# Patient Record
Sex: FEMALE | Race: WHITE | NOT HISPANIC OR LATINO
[De-identification: names, ages, dates, MRNs, and addresses within clinical notes are randomized per-mention and may not be internally consistent; named-entity substitution may affect disease eponyms.]

---

## 2019-08-01 ENCOUNTER — APPOINTMENT (OUTPATIENT)
Dept: ORTHOPEDIC SURGERY | Facility: CLINIC | Age: 79
End: 2019-08-01
Payer: MEDICARE

## 2019-08-01 VITALS
HEIGHT: 64 IN | SYSTOLIC BLOOD PRESSURE: 150 MMHG | HEART RATE: 79 BPM | BODY MASS INDEX: 29.02 KG/M2 | WEIGHT: 170 LBS | DIASTOLIC BLOOD PRESSURE: 79 MMHG

## 2019-08-01 DIAGNOSIS — Z87.39 PERSONAL HISTORY OF OTHER DISEASES OF THE MUSCULOSKELETAL SYSTEM AND CONNECTIVE TISSUE: ICD-10-CM

## 2019-08-01 DIAGNOSIS — Z98.1 ARTHRODESIS STATUS: ICD-10-CM

## 2019-08-01 DIAGNOSIS — M60.9 MYOSITIS, UNSPECIFIED: ICD-10-CM

## 2019-08-01 PROCEDURE — 99204 OFFICE O/P NEW MOD 45 MIN: CPT | Mod: 25

## 2019-08-01 PROCEDURE — 20552 NJX 1/MLT TRIGGER POINT 1/2: CPT

## 2019-08-01 NOTE — PHYSICAL EXAM
[de-identified] : Cervical exam:\par CONSTITUTIONAL: Patient is a very pleasant individual who is well-nourished and appears stated age. \par PSYCHIATRIC: Alert and oriented times three and in no apparent distress, and participates with orthopedic evaluation well.\par HEAD: Atraumatic and nonsyndromic in appearance.\par EENT: No thyromegaly, EOMI.\par RESPIRATORY: Respiratory rate is regular, not dyspneic on examination.\par LYMPHATICS: There is no cervical or axillary lymphadenopathy.\par INTEGUMENTARY: Skin is clean, dry, and intact about the bilateral upper extremities and cervical spine. \par VASCULAR: There is brisk capillary refill about the bilateral upper extremities and radial pulses are 2/4. \par NEUROLOGIC: Negative L'hirmitte, negative Spurling’s sign. There are no pathologic reflexes. There is no decrease in sensation of the bilateral upper extremities on Wartenberg pinwheel examination. Deep tendon reflexes are well-maintained at +2/4 of the bilateral upper extremities and are symmetric.\par MUSCULOSKELETAL: There is no visible muscular atrophy. Manual motor strength is well maintained in the bilateral upper extremities. Cervical range of motion is well maintained. The patient ambulates in a non-myelopathic manner. Normal secondary orthopaedic exam of bilateral shoulders, elbows and hands. Elbow flexion and extension, wrist extension, finger flexion and abduction are well maintained. Consistent with bilateral cervical, paraspinal, and trapezial myositis.

## 2019-08-01 NOTE — ADDENDUM
[FreeTextEntry1] : Documented by Eben Walker acting as a scribe for Dr. Omar Odell on 08/01/2019.\par \par All medical record entries made by the Scribe were at my, Dr. Omar Odell, direction and personally dictated by me on 08/01/2019. I have reviewed the chart and agree that the record accurately reflects my personal performance of the history, physical exam, assessment and plan. I have also personally directed, reviewed, and agreed with the chart.

## 2019-08-01 NOTE — HISTORY OF PRESENT ILLNESS
[de-identified] : 79 year old female presents with chronic cervical upper thoracic pain. She states the same location, quality, intensity has been occurring x 10 years. She had a recent lumbar fusion in [place] in Feb 2019, and is currently in [place] for the summer. VIGNESH questionnaire is negative. She states she feels better with activity.  [Ataxia] : no ataxia [Incontinence] : no incontinence [Loss of Dexterity] : good dexterity [Urinary Ret.] : no urinary retention

## 2019-08-01 NOTE — PROCEDURE
[de-identified] : Verbal consent was obtained and all questions, comments and concerns were addressed. After Bilateral trapezial trigger points were cleansed with alcohol prep X 3, ethyl chloride was used as local anesthetic. Under sterile precautions 1cc of 1% lidocaine without epinephrine, plus 10 mg depomedrol, were instilled into the affected trigger points. Patient tolerated procedure well. Dry sterile dressing was placed and patient described relief of pain 5 minutes after procedure was performed.\par

## 2019-08-01 NOTE — DISCUSSION/SUMMARY
[de-identified] : I do not believe she has any acute operative indications at this time. I have recommended that the pt continue with a conservative treatment plan. Pt has been referred to physical therapy for decreased pain modalities, core strengthening modalities and physical modalities. Pt will be seen 1 month prior to her leaving [facility].  At next visit I will obtain thoracic and Lumbar Xrays.

## 2019-08-27 ENCOUNTER — APPOINTMENT (OUTPATIENT)
Dept: ORTHOPEDIC SURGERY | Facility: CLINIC | Age: 79
End: 2019-08-27

## 2020-01-23 ENCOUNTER — APPOINTMENT (RX ONLY)
Dept: URBAN - METROPOLITAN AREA CLINIC 102 | Facility: CLINIC | Age: 80
Setting detail: DERMATOLOGY
End: 2020-01-23

## 2020-01-23 DIAGNOSIS — L57.0 ACTINIC KERATOSIS: ICD-10-CM

## 2020-01-23 PROCEDURE — ? PDT: BLUE

## 2020-01-23 PROCEDURE — 96567 PDT DSTR PRMLG LES SKN: CPT

## 2020-01-23 ASSESSMENT — LOCATION DETAILED DESCRIPTION DERM: LOCATION DETAILED: LEFT INFERIOR MEDIAL MALAR CHEEK

## 2020-01-23 ASSESSMENT — LOCATION ZONE DERM: LOCATION ZONE: FACE

## 2020-01-23 ASSESSMENT — LOCATION SIMPLE DESCRIPTION DERM: LOCATION SIMPLE: LEFT CHEEK

## 2022-08-31 DIAGNOSIS — M25.562 PAIN IN LEFT KNEE: ICD-10-CM

## 2022-09-01 ENCOUNTER — APPOINTMENT (OUTPATIENT)
Dept: ORTHOPEDIC SURGERY | Facility: CLINIC | Age: 82
End: 2022-09-01

## 2022-09-01 VITALS
BODY MASS INDEX: 28.17 KG/M2 | WEIGHT: 165 LBS | HEART RATE: 77 BPM | DIASTOLIC BLOOD PRESSURE: 86 MMHG | SYSTOLIC BLOOD PRESSURE: 131 MMHG | HEIGHT: 64 IN

## 2022-09-01 DIAGNOSIS — M17.12 UNILATERAL PRIMARY OSTEOARTHRITIS, LEFT KNEE: ICD-10-CM

## 2022-09-01 PROCEDURE — 99204 OFFICE O/P NEW MOD 45 MIN: CPT | Mod: 25

## 2022-09-01 PROCEDURE — 73564 X-RAY EXAM KNEE 4 OR MORE: CPT | Mod: LT

## 2022-09-01 PROCEDURE — 20610 DRAIN/INJ JOINT/BURSA W/O US: CPT | Mod: LT

## 2022-09-01 NOTE — HISTORY OF PRESENT ILLNESS
[de-identified] : Patient is an 82-year-old female here today for evaluation of left knee pain has been going on for the past few months.  Patient states she has pain mainly over the lateral and posterior aspect of the knee.  Hurts with navigating stairs and rising reseated position.  Is unable to take NSAIDs but use Voltaren gel and Tylenol with some relief.  Wears a brace on the knee that helps.  Denies any locking popping or buckling.  Denies any neurovascular compromise.  Does complain of swelling in the knee.

## 2022-09-01 NOTE — PROCEDURE
[de-identified] : Gel One # 1 Left knee\par Discussed at length with the patient the planned Gel One injection. The risks, benefits, convalescence and alternatives were reviewed. The possible side effects discussed included but were not limited to: pain, swelling, heat and redness. There symptoms are generally mild but if they are extensive then contact the office. Giving pain relievers by mouth such as NSAID´s or Tylenol can generally treat the reactions to Gel ONe. Rare cases of infection have been noted. Rash, hives and itching may occur post injection. If you have muscle pain or cramps, flushing and or swelling of the face, rapid heart beat, nausea, dizziness, fever, chills, headache, difficulty breathing, swelling in the arms or legs, or have a prickly feeling of your skin, contact a health care provider immediately.\par \par Following this discussion, the knee was prepped with betadine and under sterile condition the Gel One injection was performed with a 22 gauge needle. The needle was introduced into the joint, aspiration was performed to ensure intra-articular placement and the medication was injected. Upon withdrawal of the needle the site was cleaned with alcohol and a bandaid applied. The patient tolerated the injection well and there were no adverse effects. Post injection instructions included no strenuous activity for 24 hours, cryotherapy and if there are any adverse effects to contact the office.

## 2022-09-01 NOTE — PHYSICAL EXAM
[de-identified] : GENERAL APPEARANCE: Well nourished and hydrated, pleasant, alert, and oriented x 3. Appears their stated age. \par HEENT: Normocephalic, extraocular eye motion intact. Nasal septum midline. Oral cavity clear. External auditory canal clear. \par RESPIRATORY: Breath sounds clear and audible in all lobes. No wheezing, No accessory muscle use.\par CARDIOVASCULAR: No apparent abnormalities. No lower leg edema. No varicosities. Pedal pulses are palpable.\par NEUROLOGIC: Sensation is normal, no muscle weakness in the upper or lower extremities.\par DERMATOLOGIC: No apparent skin lesions, moist, warm, no rash.\par SPINE: Cervical spine appears normal and moves freely; thoracic spine appears normal and moves freely; lumbosacral spine appears normal and moves freely, normal, nontender.\par MUSCULOSKELETAL: Hands, wrists, and elbows are normal and move freely, shoulders are normal and move freely. \par Psychiatric: Oriented to person, place, and time, insight and judgement were intact and the affect was normal. \par Musculoskeletal:. Left knee exam shows moderate effusion, ROM is 5-1 20 degrees, no instability, no pain with Alec, lateral joint line tenderness. \par 5/5 motor strength in bilateral lower extremities. Sensory: Intact in bilateral lower extremities. DTRs: Biceps, brachioradialis, triceps, patellar, ankle and plantar 2+ and symmetric bilaterally. Pulses: dorsalis pedis, posterior tibial, femoral, popliteal, and radial 2+ and symmetric bilaterally. \par Constitutional: Alert and in no acute distress, but well-appearing.  [de-identified] : 4 views left knee obtained the office today show no acute fracture or dislocation.  There are severe lateral joint space narrowing bone-on-bone osteoarthritis tricompartmental degenerative changes consistent with Kellgren-Derek grade 3 4 changes.

## 2022-09-01 NOTE — DISCUSSION/SUMMARY
[Medication Risks Reviewed] : Medication risks reviewed [Surgical risks reviewed] : Surgical risks reviewed [de-identified] : Patient is an 82-year-old female with severe left knee osteoarthritis presenting today for initial evaluation.  She states she has had multiple cortisone injections in the past year and is unable to tolerate any further cortisone injections.  She is having significant pain in the knee.  She would like to try viscosupplementation I think that is indicated.  I gave her a gel 1 injection which she tolerated well.  I discussed physical therapy with her however she states she is unable to attend that due to the fact that she lives in an area where there is no physical therapy.  She is going to continue to use Tylenol and Voltaren gel as needed for the pain.  I will see her back on an as-needed basis for her left knee.  All questions were asked and answered.

## 2025-06-16 ENCOUNTER — TELEPHONE (OUTPATIENT)
Dept: HEALTH INFORMATION MANAGEMENT | Facility: OTHER | Age: 85
End: 2025-06-16
Payer: MEDICARE